# Patient Record
Sex: MALE | Race: WHITE
[De-identification: names, ages, dates, MRNs, and addresses within clinical notes are randomized per-mention and may not be internally consistent; named-entity substitution may affect disease eponyms.]

---

## 2019-09-27 ENCOUNTER — HOSPITAL ENCOUNTER (OUTPATIENT)
Dept: HOSPITAL 61 - SURGPAT | Age: 64
Discharge: HOME | End: 2019-09-27
Attending: ORTHOPAEDIC SURGERY
Payer: MEDICARE

## 2019-09-27 DIAGNOSIS — R07.9: ICD-10-CM

## 2019-09-27 DIAGNOSIS — Z01.818: Primary | ICD-10-CM

## 2019-09-27 DIAGNOSIS — K44.9: ICD-10-CM

## 2019-09-27 LAB
ALBUMIN SERPL-MCNC: 4.1 G/DL (ref 3.4–5)
ANION GAP SERPL CALC-SCNC: 8 MMOL/L (ref 6–14)
APTT BLD: 29 SEC (ref 24–38)
APTT PPP: YELLOW S
BACTERIA #/AREA URNS HPF: 0 /HPF
BASOPHILS # BLD AUTO: 0.1 X10^3/UL (ref 0–0.2)
BASOPHILS NFR BLD: 1 % (ref 0–3)
BILIRUB UR QL STRIP: NEGATIVE
BUN SERPL-MCNC: 18 MG/DL (ref 8–26)
CALCIUM SERPL-MCNC: 9.6 MG/DL (ref 8.5–10.1)
CHLORIDE SERPL-SCNC: 104 MMOL/L (ref 98–107)
CO2 SERPL-SCNC: 28 MMOL/L (ref 21–32)
CREAT SERPL-MCNC: 1.1 MG/DL (ref 0.7–1.3)
EOSINOPHIL NFR BLD: 0.1 X10^3/UL (ref 0–0.7)
EOSINOPHIL NFR BLD: 1 % (ref 0–3)
ERYTHROCYTE [DISTWIDTH] IN BLOOD BY AUTOMATED COUNT: 14.6 % (ref 11.5–14.5)
FIBRINOGEN PPP-MCNC: CLEAR MG/DL
GFR SERPLBLD BASED ON 1.73 SQ M-ARVRAT: 67.2 ML/MIN
GLUCOSE SERPL-MCNC: 79 MG/DL (ref 70–99)
HCT VFR BLD CALC: 48.4 % (ref 39–53)
HGB BLD-MCNC: 16.2 G/DL (ref 13–17.5)
LYMPHOCYTES # BLD: 1.2 X10^3/UL (ref 1–4.8)
LYMPHOCYTES NFR BLD AUTO: 13 % (ref 24–48)
MCH RBC QN AUTO: 29 PG (ref 25–35)
MCHC RBC AUTO-ENTMCNC: 34 G/DL (ref 31–37)
MCV RBC AUTO: 86 FL (ref 79–100)
MONO #: 1 X10^3/UL (ref 0–1.1)
MONOCYTES NFR BLD: 10 % (ref 0–9)
NEUT #: 6.9 X10^3/UL (ref 1.8–7.7)
NEUTROPHILS NFR BLD AUTO: 74 % (ref 31–73)
NITRITE UR QL STRIP: NEGATIVE
PH UR STRIP: 6 [PH]
PLATELET # BLD AUTO: 159 X10^3/UL (ref 140–400)
POTASSIUM SERPL-SCNC: 4.4 MMOL/L (ref 3.5–5.1)
PROT UR STRIP-MCNC: NEGATIVE MG/DL
PROTHROMBIN TIME: 13.7 SEC (ref 11.7–14)
RBC # BLD AUTO: 5.65 X10^6/UL (ref 4.3–5.7)
RBC #/AREA URNS HPF: 0 /HPF (ref 0–2)
SODIUM SERPL-SCNC: 140 MMOL/L (ref 136–145)
SQUAMOUS #/AREA URNS LPF: (no result) /LPF
UROBILINOGEN UR-MCNC: 1 MG/DL
WBC # BLD AUTO: 9.3 X10^3/UL (ref 4–11)
WBC #/AREA URNS HPF: 0 /HPF (ref 0–4)

## 2019-09-27 PROCEDURE — 85610 PROTHROMBIN TIME: CPT

## 2019-09-27 PROCEDURE — 82306 VITAMIN D 25 HYDROXY: CPT

## 2019-09-27 PROCEDURE — 87641 MR-STAPH DNA AMP PROBE: CPT

## 2019-09-27 PROCEDURE — 85651 RBC SED RATE NONAUTOMATED: CPT

## 2019-09-27 PROCEDURE — 80048 BASIC METABOLIC PNL TOTAL CA: CPT

## 2019-09-27 PROCEDURE — 82040 ASSAY OF SERUM ALBUMIN: CPT

## 2019-09-27 PROCEDURE — 36415 COLL VENOUS BLD VENIPUNCTURE: CPT

## 2019-09-27 PROCEDURE — 85025 COMPLETE CBC W/AUTO DIFF WBC: CPT

## 2019-09-27 PROCEDURE — 93005 ELECTROCARDIOGRAM TRACING: CPT

## 2019-09-27 PROCEDURE — 83036 HEMOGLOBIN GLYCOSYLATED A1C: CPT

## 2019-09-27 PROCEDURE — 71046 X-RAY EXAM CHEST 2 VIEWS: CPT

## 2019-09-27 PROCEDURE — 85730 THROMBOPLASTIN TIME PARTIAL: CPT

## 2019-09-27 PROCEDURE — 81001 URINALYSIS AUTO W/SCOPE: CPT

## 2019-09-27 NOTE — EKG
[facility]

              [address] [place] [address]

Test Date:    2019               Test Time:    13:02:16

Pat Name:     ALEXA RUIZ           Department:   

Patient ID:   [id number]           Room:          

Gender:       M                        Technician:   

:          1955               Requested By: STACEY CABRERA

Order Number: [id number]           Reading MD:   Benja Diallo MD

                                 Measurements

Intervals                              Axis          

Rate:         55                       P:            28

OK:           176                      QRS:          10

QRSD:         82                       T:            51

QT:           450                                    

QTc:          433                                    

                           Interpretive Statements

SINUS RHYTHM

NO SPECIFIC ECG ABNORMALITIES



Electronically Signed On 2019 13:28:32 CDT by Benja Diallo MD

## 2019-09-27 NOTE — RAD
EXAM: Chest, 2 views.

 

HISTORY: Preoperative evaluation. Pain.

 

COMPARISON: None.

 

FINDINGS: 2 views of chest are obtained. There is no infiltrate, effusion 

or pneumothorax. The heart is normal in size. There is a small posterior 

medial left diaphragmatic hernia.

 

IMPRESSION: No acute pulmonary finding.

 

Electronically signed by: Madeline Jiang MD (9/27/2019 4:26 PM) [facility]

## 2019-09-28 LAB — HBA1C MFR BLD: 6 % (ref 4.8–5.6)

## 2019-10-22 ENCOUNTER — HOSPITAL ENCOUNTER (INPATIENT)
Dept: HOSPITAL 61 - OPSVCIP | Age: 64
LOS: 3 days | Discharge: HOME | DRG: 470 | End: 2019-10-25
Attending: ORTHOPAEDIC SURGERY | Admitting: ORTHOPAEDIC SURGERY
Payer: MEDICARE

## 2019-10-22 VITALS — DIASTOLIC BLOOD PRESSURE: 78 MMHG | SYSTOLIC BLOOD PRESSURE: 143 MMHG

## 2019-10-22 VITALS — DIASTOLIC BLOOD PRESSURE: 80 MMHG | SYSTOLIC BLOOD PRESSURE: 140 MMHG

## 2019-10-22 VITALS — SYSTOLIC BLOOD PRESSURE: 137 MMHG | DIASTOLIC BLOOD PRESSURE: 88 MMHG

## 2019-10-22 VITALS — SYSTOLIC BLOOD PRESSURE: 142 MMHG | DIASTOLIC BLOOD PRESSURE: 79 MMHG

## 2019-10-22 VITALS — WEIGHT: 268.96 LBS | BODY MASS INDEX: 43.23 KG/M2 | HEIGHT: 66 IN

## 2019-10-22 VITALS — DIASTOLIC BLOOD PRESSURE: 99 MMHG | SYSTOLIC BLOOD PRESSURE: 154 MMHG

## 2019-10-22 VITALS — DIASTOLIC BLOOD PRESSURE: 88 MMHG | SYSTOLIC BLOOD PRESSURE: 138 MMHG

## 2019-10-22 VITALS — DIASTOLIC BLOOD PRESSURE: 90 MMHG | SYSTOLIC BLOOD PRESSURE: 143 MMHG

## 2019-10-22 VITALS — SYSTOLIC BLOOD PRESSURE: 140 MMHG | DIASTOLIC BLOOD PRESSURE: 86 MMHG

## 2019-10-22 DIAGNOSIS — E11.9: ICD-10-CM

## 2019-10-22 DIAGNOSIS — M11.261: ICD-10-CM

## 2019-10-22 DIAGNOSIS — M17.0: Primary | ICD-10-CM

## 2019-10-22 PROCEDURE — 86850 RBC ANTIBODY SCREEN: CPT

## 2019-10-22 PROCEDURE — 82962 GLUCOSE BLOOD TEST: CPT

## 2019-10-22 PROCEDURE — 88304 TISSUE EXAM BY PATHOLOGIST: CPT

## 2019-10-22 PROCEDURE — C1769 GUIDE WIRE: HCPCS

## 2019-10-22 PROCEDURE — 88311 DECALCIFY TISSUE: CPT

## 2019-10-22 PROCEDURE — 0SRC069 REPLACEMENT OF RIGHT KNEE JOINT WITH OXIDIZED ZIRCONIUM ON POLYETHYLENE SYNTHETIC SUBSTITUTE, CEMENTED, OPEN APPROACH: ICD-10-PCS | Performed by: ORTHOPAEDIC SURGERY

## 2019-10-22 PROCEDURE — 36415 COLL VENOUS BLD VENIPUNCTURE: CPT

## 2019-10-22 PROCEDURE — 85018 HEMOGLOBIN: CPT

## 2019-10-22 PROCEDURE — A4461 SURGICL DRESS HOLD NON-REUSE: HCPCS

## 2019-10-22 PROCEDURE — 86900 BLOOD TYPING SEROLOGIC ABO: CPT

## 2019-10-22 PROCEDURE — 85014 HEMATOCRIT: CPT

## 2019-10-22 PROCEDURE — 86901 BLOOD TYPING SEROLOGIC RH(D): CPT

## 2019-10-22 PROCEDURE — 73560 X-RAY EXAM OF KNEE 1 OR 2: CPT

## 2019-10-22 PROCEDURE — G0378 HOSPITAL OBSERVATION PER HR: HCPCS

## 2019-10-22 PROCEDURE — A7015 AEROSOL MASK USED W NEBULIZE: HCPCS

## 2019-10-22 RX ADMIN — KETOROLAC TROMETHAMINE SCH MLS/HR: 30 INJECTION, SOLUTION INTRAMUSCULAR at 17:14

## 2019-10-22 RX ADMIN — INSULIN LISPRO PRN UNIT: 100 INJECTION, SOLUTION INTRAVENOUS; SUBCUTANEOUS at 06:49

## 2019-10-22 RX ADMIN — OXYCODONE HYDROCHLORIDE AND ACETAMINOPHEN PRN TAB: 5; 325 TABLET ORAL at 17:14

## 2019-10-22 RX ADMIN — ONDANSETRON SCH MG: 2 INJECTION INTRAMUSCULAR; INTRAVENOUS at 12:00

## 2019-10-22 RX ADMIN — OXYCODONE HYDROCHLORIDE AND ACETAMINOPHEN PRN TAB: 5; 325 TABLET ORAL at 13:31

## 2019-10-22 RX ADMIN — INSULIN LISPRO PRN UNIT: 100 INJECTION, SOLUTION INTRAVENOUS; SUBCUTANEOUS at 10:46

## 2019-10-22 RX ADMIN — OXYCODONE HYDROCHLORIDE AND ACETAMINOPHEN PRN TAB: 5; 325 TABLET ORAL at 19:52

## 2019-10-22 RX ADMIN — CEFAZOLIN SCH MLS/HR: 1 INJECTION, POWDER, FOR SOLUTION INTRAMUSCULAR; INTRAVENOUS at 13:33

## 2019-10-22 RX ADMIN — ASPIRIN SCH MG: 325 TABLET, DELAYED RELEASE ORAL at 20:51

## 2019-10-22 RX ADMIN — SODIUM CHLORIDE, SODIUM LACTATE, POTASSIUM CHLORIDE, AND CALCIUM CHLORIDE SCH MLS/HR: .6; .31; .03; .02 INJECTION, SOLUTION INTRAVENOUS at 10:21

## 2019-10-22 RX ADMIN — ONDANSETRON SCH MG: 2 INJECTION INTRAMUSCULAR; INTRAVENOUS at 17:19

## 2019-10-22 RX ADMIN — MULTIPLE VITAMINS W/ MINERALS TAB SCH TAB: TAB at 12:00

## 2019-10-22 RX ADMIN — BACITRACIN SCH MLS/HR: 5000 INJECTION, POWDER, FOR SOLUTION INTRAMUSCULAR at 12:00

## 2019-10-22 RX ADMIN — SENNOSIDES AND DOCUSATE SODIUM SCH TAB: 8.6; 5 TABLET ORAL at 12:00

## 2019-10-22 RX ADMIN — ZOLPIDEM TARTRATE SCH MG: 5 TABLET ORAL at 20:49

## 2019-10-22 RX ADMIN — ONDANSETRON SCH MG: 4 TABLET, ORALLY DISINTEGRATING ORAL at 17:19

## 2019-10-22 RX ADMIN — ONDANSETRON SCH MG: 4 TABLET, ORALLY DISINTEGRATING ORAL at 12:00

## 2019-10-22 RX ADMIN — LISINOPRIL SCH MG: 20 TABLET ORAL at 13:32

## 2019-10-22 RX ADMIN — CEFAZOLIN SCH MLS/HR: 1 INJECTION, POWDER, FOR SOLUTION INTRAMUSCULAR; INTRAVENOUS at 19:49

## 2019-10-22 RX ADMIN — SODIUM CHLORIDE, SODIUM LACTATE, POTASSIUM CHLORIDE, AND CALCIUM CHLORIDE SCH MLS/HR: .6; .31; .03; .02 INJECTION, SOLUTION INTRAVENOUS at 06:39

## 2019-10-22 NOTE — PDOC4
Operative Note


Operative Note


Date of Procedure:     October 22, 2019


Pre-Op Diagnosis:    Unilateral primary osteoarthritis, right knee.  M17.11 


Post-Op Diagnosis:    same


Procedure:       right total knee arthroplasty with patella resurfacing, CPT 

54771


Surgeon:       Stacey Sun MD 


Assistant:       Kim HANEY 


Anesthesia:       General


EBL:       100 mL


Specimens Obtained:    right knee bone and soft tissue


Complications:       none


Implant Company:    [employer]


Drains:       hemovac plus pain catheter


Tourniquet time:      70 Minutes 


Tourniquet Pressure:    350 mm Hg


Indications for Procedure:    Arthritis pain unrelieved by nonoperative 

management





Findings: Severe osteoarthritis with bone on bone contact laterally, with full 

thickness cartilage loss medially and at the patellofemoral joint, medial and 

lateral chondrocalcinosis





Implants used: [employer] Journey II system, Size 5 right bicruciate 

stabilized Journey II BCS Oxinium femoral component, size 6 right Journey 

nonporous tibial baseplate, size 5-6 9 mm  right Journey II BCS XLPE articular 

insert, 35 mm oval Tamiko II resurfacing patellar component 





Procedure in Detail:





The patient was identified in the preoperative holding area, and the correct r

ight lower extremity was marked by me. The patient was taken to the operating 

room where the patient was anesthetized by the Department of Anesthesia. 

Preoperative antibiotics were given intravenously. Tranexamic acid 1 g was given

intravenously for intraoperative hemostasis. A "time-out" procedure was 

performed. The patient was positioned supine on the operative table with a tourn

iquet on the upper right thigh. A lateral thigh brace and heel bump were 

attached to the operating table for later intraoperative positioning. The right 

lower limb was thoroughly scrubbed, then sterile Chloraprep solution was 

applied, and the limb was draped in sterile fashion.  An impervious stockinet 

and an adhesive drape were used such that the skin was entirely covered. 





The operating team wore personal exhaust-ventilated hoods. The limb was 

exsanguinated with an Esmarch bandage, and the tourniquet was inflated.  A 

midline skin incision was made with a scalpel using the patella and tibial 

tubercle as landmarks. Electrocautery was used for hemostasis. My assistant used

rake retractors.





A medial parapatellar arthrotomy incision was used with extension into the 

distal quadriceps tendon. The patella was retracted laterally and Hohmann 

retractors were now used by my assistant. Excess synovium, the menisci, and the 

cruciate ligaments were resected sharply.  A periarticular multimodal 

ropivacaine anesthetic injection was used in the suprapatellar pouch and distal 

quadriceps muscle.





The patella was everted and exposed. The patella thickness was measured with a 

caliper, and then cut freehand with a saw, using caliper measurements to assess 

the resection.  The lateral retinaculum was partially released from the lateral 

patella using electrocautery. Rongeurs were used to make sure there were no 

remaining exposed patellar osteophytes medially or laterally.   The patella was 

sized, and then drilled for an oval three-peg patella component.  





Whitesides's line and the transepicondylar axis were marked on the femur.  An 

intramedullary entry drill was used at the intersection of [place]'s line with

the transepicondlyar axis, and an intramedullary guide was applied at 5 degrees 

of valgus.   The distal femoral cut was cut as planned with 2 mm additional 

resection due to the flexion contracture and bone deficiency. A posterior 

referencing guide was pinned to the femur, and the rotational alignment was 

matched to the Yamel's line at 4 degrees of external rotation. No AP 

adjustment was required of the femoral cutting block.   My assistant held Ho

hmann retractors and an [employer] retractor to protect the medial and lateral 

collateral ligaments, the patellar tendon, the skin and the other soft tissues. 

A 5-in-1 Journey II cutting guide was then applied and pinned to the femur.   

The posterior, anterior, and all chamfer cuts were made with the oscillating 

saw.





An extramedullary guide was pinned to the tibia and rotational alignment and the

planned resection thickness assessed.  An external alignment andrei was used to 

verify the planned cut in the varus-valgus plane and regarding posterior slope 

referencing the tibial tubercle, the tibial shaft, the ankle joint, and the 

second metatarsal.  The upper tibia was cut made with an oscillating saw.  My 

assistant held Hohmann retractors and a posterior cruciate ligament retractor to

protect the medial and lateral collateral ligaments, the patellar tendon, the 

skin, the peroneal nerve and the other soft tissues. The upper tibia was sized 

with a trial baseplate. The posterior compartment was cleared of osteophytes and

loose bodies. The periarticular anesthetic injection was used in the posterior 

compartment.





The box cut for a posterior stabilized component was made.  A preliminary 

reduction was performed with a trial femur, trial tibial baseplate and trial 

polyethylene.  Soft-tissue balancing was now performed, and extension and 

rotation of the alignments was checked using a guide andrei in the tibial trial and

a guide pin in the femur. No additional releases were required. The stability 

was assessed using different thicknesses of tibial articular surface to find 

satisfactory stability and good range of motion.  The rotation of the tibial com

ponent was marked on the upper tibia. Final trial reduction was now performed 

verifying patella tracking and tibiofemoral stability and alignment.





The tibia preparation was completed with a drill, saw, and fin punch at the 

previously noted rotation. The final implants were verified and opened. Outer 

gloves were changed by the operating team. Betadine lavage was used.  The bone 

cuts were irrigated with saline using the Rank By Search InterPulse device and then 

dried with suction and laparotomy sponges. 





Two packages of [employer] Rally HV bone cement were mixed in powdered form 

with Vancomycin 1gm and Tobramycin 1.2 gm, and then vacuum-mixed with the 

monomer, and placed into a cement gun. The cut surfaces of the bone were 

thoroughly dried with suction and with laparotomy sponges for cement 

interdigitation. The final components were cemented into place. The knee was 

kept at full extension while the cement hardened, and excess cement was removed.

 A Betadine lavage was used throughout the surgical exposure, and allowed to sit

in contact with the exposed joint surfaces for three minutes while the cement 

hardened. Tranexamic acid 1 g was redosed intravenously for additional 

intraoperative hemostasis.  The tourniquet was released, and electrocautery was 

used for hemostasis.  A final periarticular anesthetic injection was used for 

pain relief.  





A final check of range-of-motion and stability was made, and the polyethylene 

implant final size was chosen.  The polyethylene implant was secured to the 

tibial baseplate, and the knee was reduced a final time and range of motion and 

stability was confirmed.  Thorough irrigation was used.  Hemovac and pain 

catheter were used.  Topical Vancomycin 1 gm was used during the closure.  The 

arthrotomy was closed with interrupted figure-of-eight #1 PDS suture.  The 

arthrotomy incision was then run with #1 STRATAFIX Symmetric PDS Plus Knotless 

suture.  








The  subcutaneous tissues were reapproximated initially with 2-0 PDS inverted 

interrupted sutures by me and my assistant.  Next the subcuticular layer was 

reapproximated in a running fashion with #3-0 Stratafix suture by my assistant. 

 The skin incision was then covered and reinforced by my assistant with 

Acticoat, followed by a PRISCILA single use negative pressure wound therapy dressing

 Soft roll and an Ace wrap were applied. Needle and sponge counts were correct. 

There were no apparent complications. The patient returned to the recovery room 

in stable condition.











STACEY SUN MD                 Oct 22, 2019 10:11

## 2019-10-22 NOTE — NUR
Patient arrived to the unit around 1200 in a bed from PACU. Patient is on 2L oxygen per NC. 
He is ALOx4 but drowsy. His wife was called and notified of his arrival to the unit per his 
request. CHARLES hose on LLE. YASIR on right foot and SCD on left leg. ACE wrap dressing 
clean/dry/intact on RLE with IAC and hemovac in place. Pain rated around a 6 and he stated 
he did not want anything till he got some food. IV infusing properly. NO concerns noted at 
this time. Will continue to monitor

## 2019-10-22 NOTE — RAD
Examination: KNEE RIGHT 2V

 

History: Postoperative

 

Comparison/Correlation: 1/24/2019 right knee x-ray exam

 

Findings: Frontal and lateral views of the right knee were obtained by 

portable technique. Frontal and crosstable lateral views were provided.

 

Total right knee joint prosthesis is present. Catheter tubing is noted 

overlying the superior aspect of the knee joint capsule. Large knee joint 

effusion is present. Soft tissue gas and including intracapsular present. 

No fracture or bony destruction.

 

 

Impression:

Large knee joint effusion. Postoperative status. No fracture or loosening.

 

Electronically signed by: Samuel Hitchcock MD (10/22/2019 11:03 AM) [facility]

## 2019-10-23 VITALS — SYSTOLIC BLOOD PRESSURE: 156 MMHG | DIASTOLIC BLOOD PRESSURE: 83 MMHG

## 2019-10-23 VITALS — DIASTOLIC BLOOD PRESSURE: 70 MMHG | SYSTOLIC BLOOD PRESSURE: 140 MMHG

## 2019-10-23 VITALS — SYSTOLIC BLOOD PRESSURE: 144 MMHG | DIASTOLIC BLOOD PRESSURE: 78 MMHG

## 2019-10-23 VITALS — SYSTOLIC BLOOD PRESSURE: 123 MMHG | DIASTOLIC BLOOD PRESSURE: 66 MMHG

## 2019-10-23 VITALS — DIASTOLIC BLOOD PRESSURE: 87 MMHG | SYSTOLIC BLOOD PRESSURE: 144 MMHG

## 2019-10-23 LAB
HCT VFR BLD CALC: 39.3 % (ref 39–53)
HGB BLD-MCNC: 12.9 G/DL (ref 13–17.5)
MCHC RBC AUTO-ENTMCNC: 33 G/DL (ref 31–37)

## 2019-10-23 RX ADMIN — ONDANSETRON SCH MG: 4 TABLET, ORALLY DISINTEGRATING ORAL at 00:00

## 2019-10-23 RX ADMIN — SENNOSIDES AND DOCUSATE SODIUM SCH TAB: 8.6; 5 TABLET ORAL at 07:57

## 2019-10-23 RX ADMIN — ONDANSETRON SCH MG: 4 TABLET, ORALLY DISINTEGRATING ORAL at 06:00

## 2019-10-23 RX ADMIN — CELECOXIB SCH MG: 100 CAPSULE ORAL at 07:56

## 2019-10-23 RX ADMIN — OXYCODONE HYDROCHLORIDE AND ACETAMINOPHEN PRN TAB: 5; 325 TABLET ORAL at 11:58

## 2019-10-23 RX ADMIN — ASPIRIN SCH MG: 325 TABLET, DELAYED RELEASE ORAL at 20:55

## 2019-10-23 RX ADMIN — BACITRACIN SCH MLS/HR: 5000 INJECTION, POWDER, FOR SOLUTION INTRAMUSCULAR at 10:11

## 2019-10-23 RX ADMIN — CALCIUM CARBONATE (ANTACID) CHEW TAB 500 MG PRN MG: 500 CHEW TAB at 19:25

## 2019-10-23 RX ADMIN — ONDANSETRON SCH MG: 2 INJECTION INTRAMUSCULAR; INTRAVENOUS at 00:00

## 2019-10-23 RX ADMIN — ZOLPIDEM TARTRATE SCH MG: 5 TABLET ORAL at 20:55

## 2019-10-23 RX ADMIN — MULTIPLE VITAMINS W/ MINERALS TAB SCH TAB: TAB at 07:56

## 2019-10-23 RX ADMIN — OXYCODONE HYDROCHLORIDE AND ACETAMINOPHEN PRN TAB: 5; 325 TABLET ORAL at 08:00

## 2019-10-23 RX ADMIN — ONDANSETRON SCH MG: 2 INJECTION INTRAMUSCULAR; INTRAVENOUS at 06:00

## 2019-10-23 RX ADMIN — OXYCODONE HYDROCHLORIDE AND ACETAMINOPHEN PRN TAB: 5; 325 TABLET ORAL at 22:27

## 2019-10-23 RX ADMIN — LISINOPRIL SCH MG: 20 TABLET ORAL at 07:58

## 2019-10-23 RX ADMIN — CEFAZOLIN SCH MLS/HR: 1 INJECTION, POWDER, FOR SOLUTION INTRAMUSCULAR; INTRAVENOUS at 02:00

## 2019-10-23 RX ADMIN — OXYCODONE HYDROCHLORIDE AND ACETAMINOPHEN PRN TAB: 5; 325 TABLET ORAL at 16:06

## 2019-10-23 RX ADMIN — KETOROLAC TROMETHAMINE SCH MLS/HR: 30 INJECTION, SOLUTION INTRAMUSCULAR at 05:26

## 2019-10-23 RX ADMIN — ASPIRIN SCH MG: 325 TABLET, DELAYED RELEASE ORAL at 07:56

## 2019-10-23 RX ADMIN — OXYCODONE HYDROCHLORIDE AND ACETAMINOPHEN PRN TAB: 5; 325 TABLET ORAL at 18:30

## 2019-10-23 NOTE — NUR
MILT IS UP IN RECLINER FOR BREAKFAST. WIFE AT BEDSIDE. HE IS RATING HIS PAIN 6-7. MEDICATED 
WITH PERCOCET. HE HAS GOOD MOTION, SENSATION AND PULSES BILATERAL LOWER EXTREMITIES

## 2019-10-23 NOTE — NUR
ORIGINAL SURGICAL DRESSING REMOVED AND TOLERATED WELL. IAC, HEMOVAC AND PRISCILA DRESSING 
REMOVED; MOD-LARGE AMOUNT OF SEROSANGUINEOUS DRAINAGE IN THE LOWER HALF OF PRISCILA DRESSING. 
GREEN LIGHT BLINKING

## 2019-10-23 NOTE — PDOC
PROGRESS NOTES


Subjective


Subjective


Pain controlled. No complaints.





Objective


Vital Signs





Vital Signs








  Date Time  Temp Pulse Resp B/P (MAP) Pulse Ox O2 Delivery O2 Flow Rate FiO2


 


10/23/19 02:30 97.7 54 18 123/66 (85) 95 Nasal Cannula 2.0 





 97.7       








Physical Exam


Dressing intact and dry.  Hemovac and pain catheter in place. Thigh and calf 

soft.   Good active range of motion of foot including dorsiflexion and plantar 

flexion.  Cap refill at toes intact.  No signs of compartment syndrome, DVT or 

neurovascular injury.


Labs





Laboratory Tests








Test


 10/22/19


06:31 10/22/19


10:37 10/22/19


11:45 10/22/19


13:19


 


Glucose (Fingerstick)


 105 mg/dL


(70-99) 198 mg/dL


(70-99) 177 mg/dL


(70-99) 156 mg/dL


(70-99)


 


Test


 10/22/19


16:35 10/22/19


20:23 10/22/19


20:24 10/23/19


04:11


 


Glucose (Fingerstick)


 141 mg/dL


(70-99) 231 mg/dL


(70-99) 214 mg/dL


(70-99) 





 


Hemoglobin


 


 


 


 12.9 g/dL


(13.0-17.5)


 


Hematocrit


 


 


 


 39.3 %


(39.0-53.0)


 


Mean Corpuscular Hemoglobin


Concent 


 


 


 33 g/dL


(31-37)








Laboratory Tests








Test


 10/22/19


06:31 10/22/19


10:37 10/22/19


11:45 10/22/19


13:19


 


Glucose (Fingerstick)


 105 mg/dL


(70-99) 198 mg/dL


(70-99) 177 mg/dL


(70-99) 156 mg/dL


(70-99)


 


Test


 10/22/19


16:35 10/22/19


20:23 10/22/19


20:24 10/23/19


04:11


 


Glucose (Fingerstick)


 141 mg/dL


(70-99) 231 mg/dL


(70-99) 214 mg/dL


(70-99) 





 


Hemoglobin


 


 


 


 12.9 g/dL


(13.0-17.5)


 


Hematocrit


 


 


 


 39.3 %


(39.0-53.0)


 


Mean Corpuscular Hemoglobin


Concent 


 


 


 33 g/dL


(31-37)








Imaging


Postop X-rays reviewed by me.  Satisfactory TKA alignment, without apparent 

complications.








PATIENT: ALEXA RUIZ   ACCOUNT: [id number]     MRN#: [id number]


: 1955           LOCATION: [facility]         AGE: 64


SEX: M                    EXAM DT: 10/22/19         ACCESSION#: [id number]


STATUS: ADM IN            ORD. PHYSICIAN: STACEY CABRERA MD


REASON: POST OP


PROCEDURE: KNEE RIGHT 2V





Examination: KNEE RIGHT 2V


 


History: Postoperative


 


Comparison/Correlation: 2019 right knee x-ray exam


 


Findings: Frontal and lateral views of the right knee were obtained by 


portable technique. Frontal and crosstable lateral views were provided.


 


Total right knee joint prosthesis is present. Catheter tubing is noted 


overlying the superior aspect of the knee joint capsule. Large knee joint 


effusion is present. Soft tissue gas and including intracapsular present. 


No fracture or bony destruction.


 


 


Impression:


Large knee joint effusion. Postoperative status. No fracture or loosening.


 


Electronically signed by: Andres Wyatt MD (10/22/2019 11:03 AM) [facility]














DICTATED and SIGNED BY:     ANDRES WYATT MD


DATE:     10/22/19 1103





Assessment


Assessment


POD 1 TKA





Plan


Plan of Care


Continue POC.  He needs PT assistance even to get OOB and is a significant fall 

risk at this time.  He has small pupils, dry mouth and has other signs of 

narcotic use, but it's controlling his pain and he's awake, alert and 

conversive.











STACEY CABRERA MD                 Oct 23, 2019 05:55

## 2019-10-24 VITALS — SYSTOLIC BLOOD PRESSURE: 150 MMHG | DIASTOLIC BLOOD PRESSURE: 83 MMHG

## 2019-10-24 VITALS — SYSTOLIC BLOOD PRESSURE: 144 MMHG | DIASTOLIC BLOOD PRESSURE: 74 MMHG

## 2019-10-24 LAB
HCT VFR BLD CALC: 35.4 % (ref 39–53)
HGB BLD-MCNC: 11.9 G/DL (ref 13–17.5)
MCHC RBC AUTO-ENTMCNC: 34 G/DL (ref 31–37)

## 2019-10-24 RX ADMIN — MULTIPLE VITAMINS W/ MINERALS TAB SCH TAB: TAB at 07:40

## 2019-10-24 RX ADMIN — OXYCODONE HYDROCHLORIDE AND ACETAMINOPHEN PRN TAB: 5; 325 TABLET ORAL at 02:34

## 2019-10-24 RX ADMIN — CELECOXIB SCH MG: 100 CAPSULE ORAL at 07:40

## 2019-10-24 RX ADMIN — ZOLPIDEM TARTRATE SCH MG: 5 TABLET ORAL at 21:03

## 2019-10-24 RX ADMIN — OXYCODONE HYDROCHLORIDE AND ACETAMINOPHEN PRN TAB: 5; 325 TABLET ORAL at 16:48

## 2019-10-24 RX ADMIN — ASPIRIN SCH MG: 325 TABLET, DELAYED RELEASE ORAL at 21:03

## 2019-10-24 RX ADMIN — CALCIUM CARBONATE (ANTACID) CHEW TAB 500 MG PRN MG: 500 CHEW TAB at 07:16

## 2019-10-24 RX ADMIN — ASPIRIN SCH MG: 325 TABLET, DELAYED RELEASE ORAL at 07:41

## 2019-10-24 RX ADMIN — LISINOPRIL SCH MG: 20 TABLET ORAL at 07:46

## 2019-10-24 RX ADMIN — OXYCODONE HYDROCHLORIDE AND ACETAMINOPHEN PRN TAB: 5; 325 TABLET ORAL at 07:39

## 2019-10-24 RX ADMIN — OXYCODONE HYDROCHLORIDE AND ACETAMINOPHEN PRN TAB: 5; 325 TABLET ORAL at 12:08

## 2019-10-24 RX ADMIN — OXYCODONE HYDROCHLORIDE AND ACETAMINOPHEN PRN TAB: 5; 325 TABLET ORAL at 21:03

## 2019-10-24 RX ADMIN — SENNOSIDES AND DOCUSATE SODIUM SCH TAB: 8.6; 5 TABLET ORAL at 07:40

## 2019-10-24 NOTE — PDOC
PROGRESS NOTES


Subjective


Subjective


Pain controlled.  No major complaints.





Objective


Vital Signs





Vital Signs








  Date Time  Temp Pulse Resp B/P (MAP) Pulse Ox O2 Delivery O2 Flow Rate FiO2


 


10/24/19 17:46 97.9 61 16 144/74 (97)  Room Air  





 97.9       


 


10/24/19 06:00     96   


 


10/23/19 23:30       2.0 








Physical Exam


PRISCILA dressing intact but with prominent bloody drainage distally. Pain catheter 

and drain have been removed. Calf soft and nontender although swollen. Good AROM

of ankle. Minimal erythema/warmth. Not yet safely ambulating with walker. 

Requires PT or nursing assistance, and gait belt for safe transition from chair 

or bed to walker.


Labs





Laboratory Tests








Test


 10/22/19


20:23 10/22/19


20:24 10/23/19


04:11 10/23/19


06:14


 


Glucose (Fingerstick)


 231 mg/dL


(70-99) 214 mg/dL


(70-99) 


 135 mg/dL


(70-99)


 


Hemoglobin


 


 


 12.9 g/dL


(13.0-17.5) 





 


Hematocrit


 


 


 39.3 %


(39.0-53.0) 





 


Mean Corpuscular Hemoglobin


Concent 


 


 33 g/dL


(31-37) 





 


Test


 10/23/19


11:23 10/23/19


16:40 10/23/19


20:38 10/24/19


04:45


 


Glucose (Fingerstick)


 115 mg/dL


(70-99) 138 mg/dL


(70-99) 126 mg/dL


(70-99) 





 


Hemoglobin


 


 


 


 11.9 g/dL


(13.0-17.5)


 


Hematocrit


 


 


 


 35.4 %


(39.0-53.0)


 


Mean Corpuscular Hemoglobin


Concent 


 


 


 34 g/dL


(31-37)


 


Test


 10/24/19


06:00 10/24/19


10:58 10/24/19


16:22 





 


Glucose (Fingerstick)


 124 mg/dL


(70-99) 115 mg/dL


(70-99) 129 mg/dL


(70-99) 











Laboratory Tests








Test


 10/23/19


20:38 10/24/19


04:45 10/24/19


06:00 10/24/19


10:58


 


Glucose (Fingerstick)


 126 mg/dL


(70-99) 


 124 mg/dL


(70-99) 115 mg/dL


(70-99)


 


Hemoglobin


 


 11.9 g/dL


(13.0-17.5) 


 





 


Hematocrit


 


 35.4 %


(39.0-53.0) 


 





 


Mean Corpuscular Hemoglobin


Concent 


 34 g/dL


(31-37) 


 





 


Test


 10/24/19


16:22 


 


 





 


Glucose (Fingerstick)


 129 mg/dL


(70-99) 


 


 














Assessment


Assessment


POD #2 TKA





Plan


Plan of Care


Continue POC. He will need PRISCILA dressing change tomorrow.  Discharge planning 

for tomorrow.  Aspirin 325 mg po BID and mobilization for DVT prophylaxis.











STACEY CABRERA MD                 Oct 24, 2019 18:37

## 2019-10-25 VITALS — DIASTOLIC BLOOD PRESSURE: 88 MMHG | SYSTOLIC BLOOD PRESSURE: 160 MMHG

## 2019-10-25 VITALS — SYSTOLIC BLOOD PRESSURE: 148 MMHG | DIASTOLIC BLOOD PRESSURE: 74 MMHG

## 2019-10-25 LAB
HCT VFR BLD CALC: 33.3 % (ref 39–53)
HGB BLD-MCNC: 11 G/DL (ref 13–17.5)
MCHC RBC AUTO-ENTMCNC: 33 G/DL (ref 31–37)

## 2019-10-25 RX ADMIN — SENNOSIDES AND DOCUSATE SODIUM SCH TAB: 8.6; 5 TABLET ORAL at 08:10

## 2019-10-25 RX ADMIN — OXYCODONE HYDROCHLORIDE AND ACETAMINOPHEN PRN TAB: 5; 325 TABLET ORAL at 12:18

## 2019-10-25 RX ADMIN — LISINOPRIL SCH MG: 20 TABLET ORAL at 08:19

## 2019-10-25 RX ADMIN — MULTIPLE VITAMINS W/ MINERALS TAB SCH TAB: TAB at 08:18

## 2019-10-25 RX ADMIN — ASPIRIN SCH MG: 325 TABLET, DELAYED RELEASE ORAL at 08:10

## 2019-10-25 RX ADMIN — OXYCODONE HYDROCHLORIDE AND ACETAMINOPHEN PRN TAB: 5; 325 TABLET ORAL at 07:02

## 2019-10-25 RX ADMIN — CELECOXIB SCH MG: 100 CAPSULE ORAL at 08:10

## 2019-10-25 NOTE — PATHOLOGY
[facility] Accession Number: [id number]

.                                                                01

Material submitted:                                        .

knee - RIGHT KNEE BONE AND TISSUE. Modifiers: right

.                                                                01

Clinical history:                                          .

Right knee osteoarthritis

.                                                                02

**********************************************************************

Diagnosis:

Segments of bone and soft tissue, right total knee arthroplasty:

- Advanced degenerative arthritis.

(JPM:; 10/24/2019)

MBR  10/24/2019  1559 Local

**********************************************************************

.                                                                02

Electronically signed:                                     .

Michael Carroll MD, Pathologist

NPI- [id number]

.                                                                01

Gross description:                                         .

Received in formalin labeled "Eber Lopez, right knee bone and

tissue," are multiple segments of bone, including tibial plateau,

measuring 13.4 x 10.2 x 2.1 cm in aggregate dimensions.  Scant soft tissue

and scant possible meniscus are present.  The specimen displays focal

eburnation of the articular surfaces.  Representative bone and soft tissue

are submitted in cassette A1, following decalcification.

([facility]; 10/23/2019)

XDC/XDC  10/23/2019  0738 Local

.                                                                02

Pathologist provided ICD-10:

M17.11

.                                                                02

CPT                                                        .

648838, [id number]

Specimen Comment: A courtesy copy of this report has been sent to

Specimen Comment: [phone], [phone].

Specimen Comment: Report sent to  / DR ANDRES

***Performed at:  01

   [facility]

   [address], [place]  [id number]

   MD Brayden Padgett MD Phone:  [phone]

***Performed at:  02

   [facility]

   [address], [place]  [id number]

   MD Michael Carroll MD Phone:  [phone]

## 2019-10-25 NOTE — PDOC
PROGRESS NOTES


Subjective


Subjective


No complaints.  Planning on discharge today to home.





Objective


Vital Signs





Vital Signs








  Date Time  Temp Pulse Resp B/P (MAP) Pulse Ox O2 Delivery O2 Flow Rate FiO2


 


10/25/19 08:19  62  152/81    


 


10/25/19 08:00      Room Air  


 


10/25/19 07:02   20     


 


10/25/19 05:45 97.5    97   





 97.5       


 


10/23/19 23:30       2.0 








Physical Exam


PRISCILA will be changed today, bloody drainage. Good AROM ankle.  Calf soft and 

nontender. Minimal warmth or erythema.


Labs





Laboratory Tests








Test


 10/23/19


16:40 10/23/19


20:38 10/24/19


04:45 10/24/19


06:00


 


Glucose (Fingerstick)


 138 mg/dL


(70-99) 126 mg/dL


(70-99) 


 124 mg/dL


(70-99)


 


Hemoglobin


 


 


 11.9 g/dL


(13.0-17.5) 





 


Hematocrit


 


 


 35.4 %


(39.0-53.0) 





 


Mean Corpuscular Hemoglobin


Concent 


 


 34 g/dL


(31-37) 





 


Test


 10/24/19


10:58 10/24/19


16:22 10/25/19


05:08 10/25/19


06:06


 


Glucose (Fingerstick)


 115 mg/dL


(70-99) 129 mg/dL


(70-99) 


 125 mg/dL


(70-99)


 


Hemoglobin


 


 


 11.0 g/dL


(13.0-17.5) 





 


Hematocrit


 


 


 33.3 %


(39.0-53.0) 





 


Mean Corpuscular Hemoglobin


Concent 


 


 33 g/dL


(31-37) 











Laboratory Tests








Test


 10/24/19


16:22 10/25/19


05:08 10/25/19


06:06


 


Glucose (Fingerstick)


 129 mg/dL


(70-99) 


 125 mg/dL


(70-99)


 


Hemoglobin


 


 11.0 g/dL


(13.0-17.5) 





 


Hematocrit


 


 33.3 %


(39.0-53.0) 





 


Mean Corpuscular Hemoglobin


Concent 


 33 g/dL


(31-37) 














Assessment


Assessment


POD #3 TKA





Plan


Plan of Care


Discharge planning for today.  Continue PT and DVT prophylaxis.  F/U 10-14 days 

in office.











STACEY CABRERA MD                 Oct 25, 2019 11:35

## 2019-10-25 NOTE — NUR
IAC site oozing blood.  Foam placed over site.  PRISCILA dressing has old drainage to distal 
end.  Leg is swollen and bruised.

## 2019-10-25 NOTE — NUR
Discharged home with Outpatient therapy. Discharge instructions given with prescriptions. 
Answered questions and concerns. Both patient and spouse verbalized understanding. 
Discharged by w/c accompanied by spouse.

## 2019-10-25 NOTE — PDOC3
Discharge Summary


Visit Information


Date of Admission:  Oct 22, 2019


Date of Discharge:  Oct 25, 2019


Final Diagnosis


postop care after R TKA


osteoarthritis R knee





Brief Hospital Course


Allergies





                                    Allergies








Coded Allergies Type Severity Reaction Last Updated Verified


 


  No Known Drug Allergies    10/22/19 No








Vital Signs





Vital Signs








  Date Time  Temp Pulse Resp B/P (MAP) Pulse Ox O2 Delivery O2 Flow Rate FiO2


 


10/25/19 08:19  62  152/81    


 


10/25/19 08:00      Room Air  


 


10/25/19 07:02   20     


 


10/25/19 05:45 97.5    97   





 97.5       


 


10/23/19 23:30       2.0 








Lab Results





Laboratory Tests








Test


 10/23/19


16:40 10/23/19


20:38 10/24/19


04:45 10/24/19


06:00


 


Glucose (Fingerstick)


 138 mg/dL


(70-99) 126 mg/dL


(70-99) 


 124 mg/dL


(70-99)


 


Hemoglobin


 


 


 11.9 g/dL


(13.0-17.5) 





 


Hematocrit


 


 


 35.4 %


(39.0-53.0) 





 


Mean Corpuscular Hemoglobin


Concent 


 


 34 g/dL


(31-37) 





 


Test


 10/24/19


10:58 10/24/19


16:22 10/25/19


05:08 10/25/19


06:06


 


Glucose (Fingerstick)


 115 mg/dL


(70-99) 129 mg/dL


(70-99) 


 125 mg/dL


(70-99)


 


Hemoglobin


 


 


 11.0 g/dL


(13.0-17.5) 





 


Hematocrit


 


 


 33.3 %


(39.0-53.0) 





 


Mean Corpuscular Hemoglobin


Concent 


 


 33 g/dL


(31-37) 





 


Test


 10/25/19


11:36 


 


 





 


Glucose (Fingerstick)


 106 mg/dL


(70-99) 


 


 











Laboratory Tests








Test


 10/24/19


16:22 10/25/19


05:08 10/25/19


06:06 10/25/19


11:36


 


Glucose (Fingerstick)


 129 mg/dL


(70-99) 


 125 mg/dL


(70-99) 106 mg/dL


(70-99)


 


Hemoglobin


 


 11.0 g/dL


(13.0-17.5) 


 





 


Hematocrit


 


 33.3 %


(39.0-53.0) 


 





 


Mean Corpuscular Hemoglobin


Concent 


 33 g/dL


(31-37) 


 











Brief Hospital Course


64 year old who presented with knee osteoarthritis, for elective total knee 

arthroplasty.  The patient underwent total knee arthroplasty under general 

anesthesia the day of admission.  Perioperative antibiotics and DVT prophylaxis 

were used.  Postoperatively physical therapy and case management were consulted.

 The patient progressed and is stable for discharge.





Discharge Information


Condition at Discharge:  Stable


Follow Up:  Weeks


Disposition/Orders:  D/C to Home


Scheduled


Aspirin (Aspir-Low), 1 TAB PO DAILY, (Reported)


Aspirin (Aspirin Ec), 325 MG PO BID


Celecoxib (Celebrex), 200 MG PO DAILY


Lisinopril (Lisinopril), 1 TAB PO DAILY, (Reported)


Metformin Hcl (Metformin Hcl), 500 MG PO BIDWMEALS, (Reported)


Multivitamin (One-Daily Multi-Vitamin), 1 EACH PO DAILY, (Reported)


Multivits,Ca,Minerals/Iron/Fa (Thera-M Tablet), 1 TAB PO DAILY


Sennosides/Docusate Sodium (Senna-Time S Tablet), 1 TAB PO DAILY





Scheduled PRN


Zolpidem Tartrate (Zolpidem Tartrate), 5 MG PO PRN QHS PRN for INSOMNIA, 

(Reported)





Patient Instructions


Patient Instructions


Continue to weight bearing as tolerated with walker. Keep PRISCILA dressing  intact 

and dry. Cut off PRISCILA "tail" and throw away battery pack on the 7th day after 

surgery. Tape down PRISCILA tail Leave PRISCILA dressing intact otherwise. Follow up 

with Dr. Sun's office in 10-14 days. Call for appointment [phone] unless

already scheduled. Continue enteric coated aspirin 325 mg by mouth twice a day 

for 30 days to prevent blood clots.











STACEY SUN MD                 Oct 25, 2019 11:42

## 2019-10-31 NOTE — PDOC1
History and Physical


Date of Admission


Date of Admission


10/22/2019





Identification/Chief Complaint


Chief Complaint


Right knee osteoarthritis pain





Source


Source:  Chart review





History of Present Illness


History of Present Illness


64-year-old man who comes in electively for right total knee arthroplasty. He 

has osteoarthritis in both knees right greater than left. He tried cortisone 

injections and physical therapy previously. He denies smoking history or 

metal/nickel allergy





Past Medical History


Endocrine:  Diabetes





Family History


Family History:  Diabetes





Social History


Smoke:  No


ALCOHOL:  none





Current Problem List


Problem List


M17.11 osteoarthritis right knee





Current Medications


Current Medications





Current Medications


Morphine Sulfate 5 mg/Ketorolac Tromethamine 30 mg/Ropivacaine 60 ml/Epinephrine

HCl 0.5 mg/Sodium Chloride 100 ml @  100 mls/hr 1X  ONCE INT ART  Last 

administered on 10/22/19at 08:16;  Start 10/22/19 at 06:00;  Stop 10/22/19 at 

06:59;  Status DC


Ondansetron HCl (Zofran) 4 mg PRN Q6HRS  PRN IV NAUSEA/VOMITING;  Start 10/22/19

at 07:00;  Stop 10/23/19 at 06:59;  Status DC


Fentanyl Citrate (Fentanyl 2ml Vial) 25 mcg PRN Q5MIN  PRN IV MILD PAIN 1-3;  S

tart 10/22/19 at 07:00;  Stop 10/22/19 at 14:58;  Status DC


Fentanyl Citrate (Fentanyl 2ml Vial) 50 mcg PRN Q5MIN  PRN IV MODERATE TO SEVERE

PAIN;  Start 10/22/19 at 07:00;  Stop 10/22/19 at 14:58;  Status DC


Morphine Sulfate (Morphine Sulfate) 1 mg PRN Q10MIN  PRN IV SEVERE PAIN 7-10;  

Start 10/22/19 at 07:00;  Stop 10/22/19 at 14:58;  Status DC


Ringer's Solution 1,000 ml @  30 mls/hr Q24H IV  Last administered on 10/22/19at

10:21;  Start 10/22/19 at 07:00;  Stop 10/22/19 at 14:58;  Status DC


Lidocaine HCl (Xylocaine-Mpf 1% 2ml Vial) 2 ml PRN 1X  PRN ID PRIOR TO IV START;

 Start 10/22/19 at 07:00;  Stop 10/23/19 at 06:59;  Status DC


Hydromorphone HCl (Dilaudid) 0.5 mg PRN Q10MIN  PRN IV SEV PAIN, Second choice; 

Start 10/22/19 at 07:00;  Stop 10/22/19 at 14:58;  Status DC


Prochlorperazine Edisylate (Compazine) 5 mg PACU PRN  PRN IV NAUSEA, MRX1;  

Start 10/22/19 at 07:00;  Stop 10/22/19 at 14:58;  Status DC


Celecoxib (CeleBREX) 400 mg 1X PREOP  PRN PO PRIOR TO PROCEDURE;  Start 10/22/19

at 06:00;  Stop 10/22/19 at 18:00;  Status DC


Acetaminophen (Tylenol) 1,000 mg 1X PREOP  PRN PO PRIOR TO PROCEDURE Last 

administered on 10/22/19at 06:49;  Start 10/22/19 at 06:00;  Stop 10/22/19 at 

18:00;  Status DC


Tranexamic Acid 1000 mg/Sodium Chloride 60 ml @ 60 mls/hr 1X PERIOP  ONCE INJ  

Last administered on 10/22/19at 07:48;  Start 10/22/19 at 06:00;  Stop 10/22/19 

at 06:59;  Status DC


Tranexamic Acid 1000 mg/Sodium Chloride 60 ml @ 60 mls/hr 1X PERIOP  ONCE INJ  

Last administered on 10/22/19at 09:16;  Start 10/22/19 at 08:00;  Stop 10/22/19 

at 08:59;  Status DC


Cefazolin Sodium 3 gm/Dextrose 100 ml @  200 mls/hr 1X PREOP  PRN IV PRIOR TO 

PROCEDURE Last administered on 10/22/19at 07:28;  Start 10/22/19 at 06:00;  Stop

10/22/19 at 15:00;  Status DC


Insulin Human Lispro (HumaLOG VIAL for OP,RR ONLY) 0-10 units PRN Q1HR  PRN SQ 

PER PROTOCOL Last administered on 10/22/19at 10:46;  Start 10/22/19 at 06:15;  

Stop 10/22/19 at 14:58;  Status DC


Vancomycin HCl (Vancomycin) 1 gm STK-MED ONCE .ROUTE  Last administered on 

10/22/19at 08:07;  Start 10/22/19 at 06:21;  Stop 10/22/19 at 06:21;  Status DC


Tobramycin Sulfate (Tobramycin Powder) 1.2 gm STK-MED ONCE .ROUTE  Last 

administered on 10/22/19at 08:07;  Start 10/22/19 at 06:21;  Stop 10/22/19 at 

06:22;  Status DC


Propofol 20 ml @ As Directed STK-MED ONCE IV ;  Start 10/22/19 at 07:13;  Stop 

10/22/19 at 07:14;  Status DC


Famotidine (Pepcid Vial) 20 mg STK-MED ONCE .ROUTE ;  Start 10/22/19 at 07:13;  

Stop 10/22/19 at 07:14;  Status DC


Lidocaine HCl (Lidocaine Pf 2% Vial) 5 ml STK-MED ONCE .ROUTE ;  Start 10/22/19 

at 07:13;  Stop 10/22/19 at 07:14;  Status DC


Ondansetron HCl (Zofran) 4 mg STK-MED ONCE .ROUTE ;  Start 10/22/19 at 07:13;  

Stop 10/22/19 at 07:14;  Status DC


Rocuronium Bromide (Zemuron) 50 mg STK-MED ONCE .ROUTE ;  Start 10/22/19 at 

07:14;  Stop 10/22/19 at 07:14;  Status DC


Fentanyl Citrate (Fentanyl 2ml Vial) 100 mcg STK-MED ONCE .ROUTE ;  Start 10/2

2/19 at 07:14;  Stop 10/22/19 at 07:14;  Status DC


Midazolam HCl (Versed) 2 mg STK-MED ONCE .ROUTE ;  Start 10/22/19 at 07:14;  

Stop 10/22/19 at 07:14;  Status DC


Ketamine HCl (Ketamine) 50 mg STK-MED ONCE .ROUTE ;  Start 10/22/19 at 07:17;  

Stop 10/22/19 at 07:17;  Status DC


Ketorolac Tromethamine (Toradol 30mg Vial) 30 mg STK-MED ONCE .ROUTE ;  Start 

10/22/19 at 07:41;  Stop 10/22/19 at 07:41;  Status DC


Vancomycin HCl (Vancomycin) 1 gm STK-MED ONCE .ROUTE  Last administered on 

10/22/19at 09:29;  Start 10/22/19 at 07:52;  Stop 10/22/19 at 07:53;  Status DC


Desflurane (Suprane) 90 ml STK-MED ONCE IH ;  Start 10/22/19 at 09:01;  Stop 

10/22/19 at 09:01;  Status DC


Glycopyrrolate (Robinul) 1 mg STK-MED ONCE .ROUTE ;  Start 10/22/19 at 09:36;  

Stop 10/22/19 at 09:36;  Status DC


Neostigmine Methylsulfate (Neostigmine Methylsulfate) 5 mg STK-MED ONCE .ROUTE ;

 Start 10/22/19 at 09:36;  Stop 10/22/19 at 09:36;  Status DC


Propofol 0 ml @ As Directed STK-MED ONCE IV ;  Start 10/22/19 at 09:59;  Stop 

10/22/19 at 10:00;  Status DC


Morphine Sulfate (Morphine Sulfate) 2 mg PRN Q1HR  PRN IV PAIN;  Start 10/22/19 

at 10:15;  Stop 10/25/19 at 14:47;  Status DC


Fentanyl Citrate (Fentanyl 2ml Vial) 25 mcg PRN Q1HR  PRN IV PAIN, 2nd CHOICE;  

Start 10/22/19 at 10:15;  Stop 10/25/19 at 14:47;  Status DC


Diphenhydramine HCl (Benadryl) 25 mg PRN Q6HRS  PRN IV ITCHING;  Start 10/22/19 

at 10:15;  Stop 10/25/19 at 14:47;  Status DC


Multivitamins (Thera M Plus) 1 tab DAILY PO  Last administered on 10/25/19at 

08:18;  Start 10/22/19 at 12:00;  Stop 10/25/19 at 14:47;  Status DC


Senna/Docusate Sodium (Senna Plus) 1 tab DAILY PO  Last administered on 

10/25/19at 08:10;  Start 10/22/19 at 12:00;  Stop 10/25/19 at 14:47;  Status DC


Sodium Chloride 1,000 ml @  40 mls/hr Q24H IV  Last administered on 10/22/19at 

12:00;  Start 10/22/19 at 10:11;  Stop 10/23/19 at 21:10;  Status DC


Prochlorperazine Maleate (Compazine) 10 mg PRN Q4HRS  PRN PO Nausea/vomiting, 

2nd choice;  Start 10/22/19 at 10:15;  Stop 10/25/19 at 14:47;  Status DC


Metoclopramide HCl (Reglan Vial) 10 mg PRN Q4HRS  PRN IV NAUSEA/VOMITING, 3rd 

CHOICE;  Start 10/22/19 at 10:15;  Stop 10/25/19 at 14:47;  Status DC


Magnesium Hydroxide (Milk Of Magnesia) 2,400 mg 1X PRN  PRN PO CONSTIPATION;  

Start 10/23/19 at 06:00;  Stop 10/24/19 at 05:59;  Status DC


Bisacodyl (Dulcolax Supp) 10 mg 1X PRN  PRN VT CONSTIPATION;  Start 10/23/19 at 

16:00;  Stop 10/24/19 at 15:59;  Status DC


Zolpidem Tartrate (Ambien) 5 mg QHS PO  Last administered on 10/24/19at 21:03;  

Start 10/22/19 at 21:00;  Stop 10/25/19 at 14:47;  Status DC


Calcium Carbonate/ Glycine (Tums) 500 mg PRN QID  PRN PO INDIGESTION Last 

administered on 10/24/19at 07:16;  Start 10/22/19 at 10:15;  Stop 10/25/19 at 

14:47;  Status DC


Morphine Sulfate (Morphine Sulfate) 4 mg PRN Q1HR  PRN IV PAIN;  Start 10/22/19 

at 10:15;  Stop 10/25/19 at 14:47;  Status DC


Ketorolac Tromethamine 30 mg/Bupivacaine HCl 20 ml/ Epinephrine HCl 0.5 mg/ 

Miscellaneous 43 ml @  258 mls/hr Q12H INT ART  Last administered on 10/23/19at 

05:26;  Start 10/22/19 at 18:00;  Stop 10/23/19 at 06:09;  Status DC


Sodium Chloride (Normal Saline Flush) 10 ml QSHIFT  PRN IV AFTER MEDS AND BLOOD 

DRAWS;  Start 10/22/19 at 10:15;  Stop 10/25/19 at 14:47;  Status DC


Fentanyl Citrate (Fentanyl 2ml Vial) 50 mcg PRN Q1HR  PRN IV PAIN, 2nd CHOICE;  

Start 10/22/19 at 10:15;  Stop 10/25/19 at 14:47;  Status DC


Ondansetron HCl (Zofran) 4 mg Q6HRS IV ;  Start 10/22/19 at 12:00;  Stop 

10/23/19 at 06:01;  Status DC


Ondansetron HCl (Zofran Odt) 4 mg Q6HRS PO ;  Start 10/22/19 at 12:00;  Stop 

10/23/19 at 06:01;  Status DC


Ondansetron HCl (Zofran) 4 mg PRN Q6HRS  PRN IV Nausea/vomiting, 1st choice;  

Start 10/23/19 at 12:00;  Stop 10/25/19 at 14:47;  Status DC


Ondansetron HCl (Zofran Odt) 4 mg PRN Q6HRS  PRN PO Nausea/vomiting, 1st choice;

 Start 10/23/19 at 12:00;  Stop 10/25/19 at 14:47;  Status DC


Dextrose (Dextrose 50%-Water Syringe) 12.5 gm PRN Q15MIN  PRN IV SEE COMMENTS;  

Start 10/22/19 at 10:15;  Stop 10/25/19 at 14:47;  Status DC


Cefazolin Sodium 3 gm/Dextrose 100 ml @  200 mls/hr Q6H IV  Last administered on

10/23/19at 02:00;  Start 10/22/19 at 14:00;  Stop 10/23/19 at 04:16;  Status DC


Celecoxib (CeleBREX) 200 mg DAILY PO  Last administered on 10/25/19at 08:10;  

Start 10/23/19 at 09:00;  Stop 10/25/19 at 14:47;  Status DC


Aspirin (Ecotrin) 325 mg BID PO  Last administered on 10/25/19at 08:10;  Start 

10/22/19 at 21:00;  Stop 10/25/19 at 14:47;  Status DC


Oxycodone/ Acetaminophen (Percocet 5/325) 1 tab PRN Q4HRS  PRN PO MODERATE PAIN 

Last administered on 10/24/19at 21:03;  Start 10/22/19 at 10:15;  Stop 10/25/19 

at 14:47;  Status DC


Lisinopril (Prinivil) 20 mg DAILY PO  Last administered on 10/25/19at 08:19;  

Start 10/22/19 at 12:00;  Stop 10/25/19 at 14:47;  Status DC


Metformin HCl (Glucophage) 500 mg BIDWMEALS PO  Last administered on 10/25/19at 

08:10;  Start 10/22/19 at 17:00;  Stop 10/25/19 at 14:47;  Status DC


Zolpidem Tartrate (Ambien) 5 mg PRN QHS  PRN PO INSOMNIA;  Start 10/22/19 at 

11:30;  Stop 10/25/19 at 14:47;  Status DC


Oxycodone/ Acetaminophen (Percocet 5/325) 1 tab PRN Q4HRS  PRN PO SEVERE PAIN;  

Start 10/22/19 at 11:30;  Stop 10/22/19 at 15:02;  Status DC


Oxycodone/ Acetaminophen (Percocet 5/325) 2 tab PRN Q4HRS  PRN PO SEVERE PAIN 

Last administered on 10/25/19at 12:18;  Start 10/22/19 at 15:15;  Stop 10/25/19 

at 14:47;  Status DC





Active Scripts


Active


Percocet  Mg Tablet ** (Oxycodone/Acetaminophen) 1 Each Tablet 1-2 Tab PO 

PRN Q4HRS PRN MDD 6 Tablet(s) 14 Days


Thera-M Tablet (Multivits,Ca,Minerals/Iron/Fa) 1 Each Tablet 1 Tab PO DAILY 90 

Days


     take a multivitamin daily by mouth for at least 90 days


Senna-Time S Tablet (Sennosides/Docusate Sodium) 1 Each Tablet 1 Tab PO DAILY 30

Days


     Take one by mouth daily. Hold for loose stools.


Celebrex (Celecoxib) 100 Mg Capsule 200 Mg PO DAILY 30 Days


     Take 200 mg Celebrex capsule one by mouth per day, for 30


     days


Aspirin Ec (Aspirin) 325 Mg Tablet.dr 325 Mg PO BID 30 Days


     Take one enteric coated 325 mg aspirin by mouth twice a day


     for 30 days, then resume your usual 81 mg aspirin daily


Reported


One-Daily Multi-Vitamin (Multivitamin) 1 Each Powd.pack 1 Each PO DAILY


Lisinopril 20 Mg Tablet 1 Tab PO DAILY


Metformin Hcl 500 Mg Tablet 500 Mg PO BIDWMEALS


Zolpidem Tartrate 5 Mg Tablet 5 Mg PO PRN QHS PRN





Allergies


Allergies:  


Coded Allergies:  


     No Known Drug Allergies (Unverified , 10/22/19)





ROS


Review of System


   OPHTHALMOLOGY


       Blurred vision  none.  Double vision  denies.  Change in vision  none.  





     ENT


       Hearing loss  none.  Change in voice  denies.  Rhinorrhea  none.  





     CARDIOLOGY


       Palpitations  none.  Shortness of breath  denies.  Chest pain  denies.  





     CONSTITUTIONAL


       Fever  denies.  Chills  denies.  Weight gain  denies.  Weakness  none.  

weight loss  denies.  Fatigue  none.  





     GASTROENTEROLOGY


       Diarrhea  denies.  Vomiting  none.  Dysphagia  none.  





     UROLOGY


       Voiding normally  yes.  Hematuria  none.  





     MUSCULOSKELETAL


       Chronic back or neck pain  Yes.  Swelling of the feet, hands, ankles and 

/or legs  denies.  Joint pain  Right Knee.  





     DERMATOLOGY


       Rash  denies.  Lumps  none.  





     NEUROLOGY


       Dizziness/lightheadedness  denies.  Double vision, temporary blindness  

denies.  Tingling/numbness  none.  





     PSYCHOLOGY


       Change in mood or personality  denies.  Memory loss  none.  





     ENDOCRINOLOGY


       Obesity   denies.  Fatigue  none.  Weight loss  none.  





     HEMATOLOGY/LYMPH


       Hepatitis  denies.  Enlarged lymph nodes  denies.





Physical Exam


General:  Alert, Cooperative


HEENT:  Atraumatic


Lungs:  Normal air movement


Heart:  RRR


Abdomen:  Soft


Extremities:  Other ( The RIGHT knee shows a nearly normal gait. There is trace 

valgus alignment. No masses. No detectable effusion. Tenderness on the joint 

lines. Range of motion is 5-105 degrees. There is crepitus with range of motion,

and pain at the extremes of motion. The knee is stable to varus and valgus 

stress without subluxation or laxity. Muscle strength is slightly weak for the 

quadriceps 4+/5 which may be due to pain or avoidance, and does not seem 

neurogenic, and the muscle tone and bulk is slightly decreased. The hamstring 

strength is 5/5. The skin is normal with no scars, rashes, lesions or ulcers. 

Light touch sensation is intact. No edema and no varicosities. Dorsalis pedis 

pulse is intact and capillary refill is normal. )


Skin:  No rashes, No breakdown, No significant lesion


Neuro:  Normal speech, Normal tone





Vitals


Vitals





Vital Signs








  Date Time  Temp Pulse Resp B/P (MAP) Pulse Ox O2 Delivery O2 Flow Rate FiO2


 


10/25/19 12:45 97.5 68 18 148/74 (98) 94 Room Air  





 97.5       


 


10/23/19 23:30       2.0 











Images


Images


    PATIENT: ALEXA LOPEZ ACCOUNT: [id number] MRN#: [id number] 


    : 1955 LOCATION: [facility] AGE: 63 


    SEX: M EXAM DT: 19 ACCESSION#: [id number] 


    STATUS: REG CLI ORD. PHYSICIAN: STACEY CABRERA MD 


    REASON: 


    PROCEDURE: KNEE RIGHT 3V 


    EXAM: Bilateral knees, standing view; right knee, 2 views. 


     HISTORY: Pain. 


     COMPARISON: None. 


     FINDINGS: A frontal standing view both knees and lateral and sunrise views 


     of the right knee are obtained. There is right lateral and patellofemoral 


     compartment joint space narrowing. There is mild right greater than left 


     lateral compartment and mild right patellofemoral compartment spurring. 


     There is bilateral medial and lateral compartment chondrocalcinosis. There 


     is narrowing of the lateral right patellofemoral joint space without ina 


     subluxation. 


     IMPRESSION: 


     1. Moderate right lateral and patellofemoral compartment and mild left 


     lateral compartment osteoarthritis of both knees. 


     2. Bilateral knee chondrocalcinosis. 


     Electronically signed by: Madeline Ayala MD (2019 10:44 AM) [facility] 


    DICTATED and SIGNED BY: MADELINE AYALA MD 


    DATE: 19 1042.





VTE Prophylaxis Ordered


VTE Prophylaxis Devices:  Yes


VTE Pharmacological Prophylaxi:  Yes





Assessment/Plan


Assessment/Plan


Mr. Lopez is here today for elective total knee arthroplasty for his right 

knee osteoarthritis. All of his questions about surgery were answered and he 

desires to proceed.











STACEY CABRERA MD                 Oct 31, 2019 17:26

## 2019-11-03 ENCOUNTER — HOSPITAL ENCOUNTER (EMERGENCY)
Dept: HOSPITAL 63 - ER | Age: 64
Discharge: HOME | End: 2019-11-03
Payer: MEDICARE

## 2019-11-03 VITALS — SYSTOLIC BLOOD PRESSURE: 158 MMHG | DIASTOLIC BLOOD PRESSURE: 78 MMHG

## 2019-11-03 VITALS — HEIGHT: 69 IN | BODY MASS INDEX: 39.4 KG/M2 | WEIGHT: 266 LBS

## 2019-11-03 DIAGNOSIS — E78.00: ICD-10-CM

## 2019-11-03 DIAGNOSIS — I10: ICD-10-CM

## 2019-11-03 DIAGNOSIS — E11.9: ICD-10-CM

## 2019-11-03 DIAGNOSIS — Z98.890: ICD-10-CM

## 2019-11-03 DIAGNOSIS — G89.18: Primary | ICD-10-CM

## 2019-11-03 DIAGNOSIS — M25.561: ICD-10-CM

## 2019-11-03 PROCEDURE — 99283 EMERGENCY DEPT VISIT LOW MDM: CPT

## 2019-11-03 NOTE — PHYS DOC
Past History


Past Medical History:  Diabetes, High Cholesterol, Hypertension


Past Surgical History:  Other


Additional Past Surgical Histo:  right knee surgery


Smoking:  Non-smoker


Alcohol Use:  None


Drug Use:  None





Adult General


Chief Complaint


Chief Complaint:  KNEE INJURY





HPI


HPI





Patient is a 64-year-old male presents complaining of right knee pain. He is 11 

days postop from right knee surgery. He took his last oxycodone postoperatively 

last night. Reports that over-the-counter pain medicine is not controlling the 

pain. He has an appointment with his orthopedic surgeon tomorrow. He is asking 

for medicine to help get him through until he sees his orthopedic surgeon robb

rrow. He denies any fever. Denies any increasing drainage. Increased pain with 

movement. No new numbness or tingling.[]





Review of Systems


Review of Systems





Constitutional: Denies fever or chills []


Eyes: Denies change in visual acuity, redness, or eye pain []


HENT: Denies nasal congestion or sore throat []


Respiratory: Denies cough or shortness of breath []


Cardiovascular: No chest pain or palpitations[]


GI: Denies abdominal pain, nausea, vomiting, bloody stools or diarrhea []


: Denies dysuria or hematuria []


Musculoskeletal: Denies back pain, see history of present illness[]


Integument: Denies rash or skin lesions []


Neurologic: Denies headache, focal weakness or sensory changes []


Endocrine: Denies polyuria or polydipsia []





All other systems were reviewed and found to be within normal limits, except as 

documented in this note.





Allergies


Allergies





Allergies








Coded Allergies Type Severity Reaction Last Updated Verified


 


  No Known Drug Allergies    11/3/19 No











Physical Exam


Physical Exam





Constitutional: Well developed, well nourished, no acute distress, non-toxic 

appearance. []


HENT: Normocephalic, atraumatic, bilateral external ears normal, oropharynx 

moist, no oral exudates, nose normal. []


Eyes: PERRLA, EOMI, conjunctiva normal, no discharge. [] 


Neck: Normal range of motion, no tenderness, supple, no stridor. [] 


Cardiovascular:Heart rate regular rhythm, no murmur []


Lungs & Thorax:  Bilateral breath sounds clear to auscultation []


Abdomen: Not examined. [] 


Skin: Warm, dry, no erythema, no rash. [] 


Back: No tenderness, no CVA tenderness. [] 


Extremities: Right knee: Dressing is in place. There is mild edema, no erythema.

 No varus or valgus laxity. Negative drawer. He is distally neurovascularly 

intact. A joint above and a joined below were evaluated and were normal.


The other 3 extremities show: No tenderness, no cyanosis, no clubbing, ROM 

intact, no edema. [] 


Neurologic: Alert and oriented X 3, normal motor function, normal sensory 

function, no focal deficits noted. []


Psychologic: Affect normal, judgement normal, mood normal. []





EKG


EKG


[]





Radiology/Procedures


Radiology/Procedures


[]





Course & Med Decision Making


Course & Med Decision Making


Pertinent Labs and Imaging studies reviewed. (See chart for details)





ED course: Patient arrived, was placed in bed, and tolerated exam well. Findings

 and plan were discussed with the patient voiced understanding. All questions 

were answered. He was discharged in improved condition.





Medical decision making: We will cover the patient with 24 hours or so worth of 

narcotic pain medicine. Stepping down from Percocet equivalent he was taking. 

There is no evidence of an infection. No evidence of new fracture[]





Dragon Disclaimer


Dragon Disclaimer


This electronic medical record was generated, in whole or in part, using a voice

 recognition dictation system.





Departure


Departure:


Impression:  


   Primary Impression:  


   Postoperative pain of knee


Disposition:  01 HOME, SELF-CARE


Condition:  IMPROVED


Referrals:  


FRANCISCO DENNISON MD (PCP)


Patient Instructions:  Pain Relief Preoperatively and Postoperatively





Additional Instructions:  


Keep your appointment with Dr. Wright tomorrow. Take the medication as prescribed.

 Return to the ER if worsening pain or any other concerns.


Scripts


Hydrocodone Bit/Acetaminophen (NORCO 5-325 TABLET) 1 Each Tablet


1-2 TAB PO Q4-6HRS for severe pain, #10 TAB


   Prov: SHRUTHI BLACK DO         11/3/19











SHRUTHI BLACK DO           Nov 3, 2019 13:38